# Patient Record
Sex: MALE | Race: WHITE | URBAN - METROPOLITAN AREA
[De-identification: names, ages, dates, MRNs, and addresses within clinical notes are randomized per-mention and may not be internally consistent; named-entity substitution may affect disease eponyms.]

---

## 2022-08-31 ENCOUNTER — TELEPHONE ENCOUNTER (OUTPATIENT)
Dept: URBAN - METROPOLITAN AREA CLINIC 113 | Facility: CLINIC | Age: 57
End: 2022-08-31

## 2022-08-31 ENCOUNTER — TELEPHONE ENCOUNTER (OUTPATIENT)
Dept: URBAN - METROPOLITAN AREA CLINIC 107 | Facility: CLINIC | Age: 57
End: 2022-08-31

## 2022-09-01 ENCOUNTER — WEB ENCOUNTER (OUTPATIENT)
Dept: URBAN - METROPOLITAN AREA CLINIC 113 | Facility: CLINIC | Age: 57
End: 2022-09-01

## 2022-09-07 ENCOUNTER — OFFICE VISIT (OUTPATIENT)
Dept: URBAN - METROPOLITAN AREA CLINIC 113 | Facility: CLINIC | Age: 57
End: 2022-09-07
Payer: COMMERCIAL

## 2022-09-07 VITALS
HEART RATE: 99 BPM | DIASTOLIC BLOOD PRESSURE: 101 MMHG | TEMPERATURE: 97.5 F | HEIGHT: 71 IN | BODY MASS INDEX: 34.44 KG/M2 | WEIGHT: 246 LBS | SYSTOLIC BLOOD PRESSURE: 153 MMHG | RESPIRATION RATE: 14 BRPM

## 2022-09-07 DIAGNOSIS — K92.0 HEMATEMESIS WITH NAUSEA: ICD-10-CM

## 2022-09-07 DIAGNOSIS — K92.1 HEMATOCHEZIA: ICD-10-CM

## 2022-09-07 DIAGNOSIS — K29.00 ACUTE SUPERFICIAL GASTRITIS WITHOUT HEMORRHAGE: ICD-10-CM

## 2022-09-07 DIAGNOSIS — R74.8 ELEVATED LIVER ENZYMES: ICD-10-CM

## 2022-09-07 DIAGNOSIS — F10.10 ALCOHOL ABUSE: ICD-10-CM

## 2022-09-07 PROCEDURE — 99244 OFF/OP CNSLTJ NEW/EST MOD 40: CPT | Performed by: INTERNAL MEDICINE

## 2022-09-07 PROCEDURE — 99204 OFFICE O/P NEW MOD 45 MIN: CPT | Performed by: INTERNAL MEDICINE

## 2022-09-07 RX ORDER — AMLODIPINE BESYLATE 5 MG/1
1 TABLET TABLET ORAL ONCE A DAY
Status: ACTIVE | COMMUNITY

## 2022-09-07 RX ORDER — LORAZEPAM 1 MG/1
1 TABLET AT BEDTIME AS NEEDED TABLET ORAL TWICE A DAY
Status: ACTIVE | COMMUNITY

## 2022-09-07 RX ORDER — PAROXETINE HYDROCHLORIDE HEMIHYDRATE 10 MG/1
1 TABLET IN THE MORNING TABLET, FILM COATED ORAL ONCE A DAY
Status: ACTIVE | COMMUNITY

## 2022-09-07 RX ORDER — PANTOPRAZOLE SODIUM 20 MG/1
1 TABLET TABLET, DELAYED RELEASE ORAL ONCE A DAY
Status: ACTIVE | COMMUNITY

## 2022-09-07 NOTE — HPI-TODAY'S VISIT:
57-year-old male presenting For an initial evaluation.  She was referred by Dr. Timmy Marie for gastritis.  She was seen in the emergency room on August 23, 2022 with heme-positive stool, nausea, and weight loss.  She did have a CT scan performed which demonstrated diffuse hepatic steatosis.  The remainder of her exam was unremarkable.  While in the emergency room he did have labs performed.  He is found have an elevated bilirubin of 1.6, AST 3:15, , and alkaline phosphatase 117.  His magnesium was 1.5. His platelet count was 197.Hepatitis studies were normal.  He is currently on a PPI for 2 weeks and feels alot better. He has had a CSC 8 yrs ago that was normal. He has lost about 30lbs from drinking ETOH (quit 2 weeks). He is back to eating food and gaining weight.

## 2022-10-06 ENCOUNTER — CLAIMS CREATED FROM THE CLAIM WINDOW (OUTPATIENT)
Dept: URBAN - METROPOLITAN AREA CLINIC 4 | Facility: CLINIC | Age: 57
End: 2022-10-06
Payer: COMMERCIAL

## 2022-10-06 ENCOUNTER — OFFICE VISIT (OUTPATIENT)
Dept: URBAN - METROPOLITAN AREA SURGERY CENTER 25 | Facility: SURGERY CENTER | Age: 57
End: 2022-10-06
Payer: COMMERCIAL

## 2022-10-06 DIAGNOSIS — K21.00 GASTROESOPHAGEAL REFLUX DISEASE WITH ESOPHAGITIS: ICD-10-CM

## 2022-10-06 DIAGNOSIS — K29.70 GASTRITIS, UNSPECIFIED, WITHOUT BLEEDING: ICD-10-CM

## 2022-10-06 DIAGNOSIS — K31.89 REACTIVE GASTROPATHY: ICD-10-CM

## 2022-10-06 PROCEDURE — 43239 EGD BIOPSY SINGLE/MULTIPLE: CPT | Performed by: INTERNAL MEDICINE

## 2022-10-06 PROCEDURE — 88305 TISSUE EXAM BY PATHOLOGIST: CPT | Performed by: PATHOLOGY

## 2022-10-06 PROCEDURE — G8907 PT DOC NO EVENTS ON DISCHARG: HCPCS | Performed by: INTERNAL MEDICINE

## 2022-10-06 PROCEDURE — 88312 SPECIAL STAINS GROUP 1: CPT | Performed by: PATHOLOGY

## 2022-10-06 RX ORDER — AMLODIPINE BESYLATE 5 MG/1
1 TABLET TABLET ORAL ONCE A DAY
Status: ACTIVE | COMMUNITY

## 2022-10-06 RX ORDER — PANTOPRAZOLE SODIUM 20 MG/1
1 TABLET TABLET, DELAYED RELEASE ORAL ONCE A DAY
Status: ACTIVE | COMMUNITY

## 2022-10-06 RX ORDER — LORAZEPAM 1 MG/1
1 TABLET AT BEDTIME AS NEEDED TABLET ORAL TWICE A DAY
Status: ACTIVE | COMMUNITY

## 2022-10-06 RX ORDER — PAROXETINE HYDROCHLORIDE HEMIHYDRATE 10 MG/1
1 TABLET IN THE MORNING TABLET, FILM COATED ORAL ONCE A DAY
Status: ACTIVE | COMMUNITY

## 2022-10-20 ENCOUNTER — TELEPHONE ENCOUNTER (OUTPATIENT)
Dept: URBAN - METROPOLITAN AREA CLINIC 113 | Facility: CLINIC | Age: 57
End: 2022-10-20

## 2022-10-26 ENCOUNTER — OFFICE VISIT (OUTPATIENT)
Dept: URBAN - METROPOLITAN AREA CLINIC 113 | Facility: CLINIC | Age: 57
End: 2022-10-26

## 2022-10-26 ENCOUNTER — TELEPHONE ENCOUNTER (OUTPATIENT)
Dept: URBAN - METROPOLITAN AREA CLINIC 113 | Facility: CLINIC | Age: 57
End: 2022-10-26

## 2022-10-27 PROBLEM — 266433003 GASTROESOPHAGEAL REFLUX DISEASE WITH ESOPHAGITIS: Status: ACTIVE | Noted: 2022-10-27

## 2022-11-09 ENCOUNTER — DASHBOARD ENCOUNTERS (OUTPATIENT)
Age: 57
End: 2022-11-09

## 2022-11-09 ENCOUNTER — OFFICE VISIT (OUTPATIENT)
Dept: URBAN - METROPOLITAN AREA CLINIC 113 | Facility: CLINIC | Age: 57
End: 2022-11-09
Payer: COMMERCIAL

## 2022-11-09 VITALS
SYSTOLIC BLOOD PRESSURE: 133 MMHG | DIASTOLIC BLOOD PRESSURE: 91 MMHG | WEIGHT: 250 LBS | RESPIRATION RATE: 20 BRPM | HEIGHT: 71 IN | BODY MASS INDEX: 35 KG/M2 | TEMPERATURE: 97.5 F | HEART RATE: 93 BPM

## 2022-11-09 DIAGNOSIS — K29.00 ACUTE SUPERFICIAL GASTRITIS WITHOUT HEMORRHAGE: ICD-10-CM

## 2022-11-09 DIAGNOSIS — K21.00 GASTROESOPHAGEAL REFLUX DISEASE WITH ESOPHAGITIS WITHOUT HEMORRHAGE: ICD-10-CM

## 2022-11-09 DIAGNOSIS — K92.0 HEMATEMESIS WITH NAUSEA: ICD-10-CM

## 2022-11-09 DIAGNOSIS — F10.10 ALCOHOL ABUSE: ICD-10-CM

## 2022-11-09 DIAGNOSIS — K92.1 HEMATOCHEZIA: ICD-10-CM

## 2022-11-09 DIAGNOSIS — R74.8 ELEVATED LIVER ENZYMES: ICD-10-CM

## 2022-11-09 PROBLEM — 15167005: Status: ACTIVE | Noted: 2022-09-07

## 2022-11-09 PROBLEM — 266433003: Status: ACTIVE | Noted: 2022-11-09

## 2022-11-09 PROCEDURE — 99214 OFFICE O/P EST MOD 30 MIN: CPT

## 2022-11-09 RX ORDER — PANTOPRAZOLE SODIUM 20 MG/1
1 TABLET TABLET, DELAYED RELEASE ORAL ONCE A DAY
Status: ACTIVE | COMMUNITY

## 2022-11-09 RX ORDER — LORAZEPAM 1 MG/1
1 TABLET AT BEDTIME AS NEEDED TABLET ORAL TWICE A DAY
Status: ACTIVE | COMMUNITY

## 2022-11-09 RX ORDER — AMLODIPINE BESYLATE 10 MG/1
1 TABLET TABLET ORAL ONCE A DAY
Status: ACTIVE | COMMUNITY

## 2022-11-09 RX ORDER — PAROXETINE HYDROCHLORIDE HEMIHYDRATE 10 MG/1
1 TABLET IN THE MORNING TABLET, FILM COATED ORAL ONCE A DAY
Status: ACTIVE | COMMUNITY

## 2022-11-09 RX ORDER — PANTOPRAZOLE SODIUM 40 MG/1
1 TABLET TABLET, DELAYED RELEASE ORAL ONCE A DAY
Qty: 90 TABLET | Refills: 2 | OUTPATIENT
Start: 2022-11-09

## 2022-11-09 NOTE — HPI-TODAY'S VISIT:
57-year-old male presents for follow-up after EGD.  He was last seen on 9/7/2022.  He did report hematemesis with nausea possibly exacerbated by drinking alcohol.  He was recommended complete alcohol cessation and an EGD.  An EGD was scheduled and we will plan to repeat LFTs at follow-up.  We would consider colonoscopy pending results.  EGD 10/6/2022:Performed without difficulty.  LA grade a reflux esophagitis without bleeding in the lower third of the esophagus status post biopsy.  Pathology revealed squamous mucosa without abnormality, no evidence for infection, eosinophilic esophagitis, dysplasia or malignancy.  Diffuse inflammation noted throughout the gastric body and antrum status post biopsies.  Pathology revealed chemical/reactive gastropathy without evidence of H. pylori or intestinal metaplasia.  Duodenum was normal.  He was recommended to continue reflux medication and follow-up as planned.   Patient admits to improvement in symptoms. He does still have GERD with consumption of spicy foods. He does admit to mild nausea. He denies abdominal pain. Bowel movements are regular and without blood per rectum or melena. He has not had ETOH in at least two months. He denies hematemesis. He had blood work in October which revealed improving LFTs. His father had Conn's syndrome.  Labs 10/14/22: AST 85, , , normal TB.   9/7/22: 57-year-old male presenting For an initial evaluation.  She was referred by Dr. Timmy Marie for gastritis.  She was seen in the emergency room on August 23, 2022 with heme-positive stool, nausea, and weight loss.  She did have a CT scan performed which demonstrated diffuse hepatic steatosis.  The remainder of her exam was unremarkable.  While in the emergency room he did have labs performed.  He is found have an elevated bilirubin of 1.6, AST 3:15, , and alkaline phosphatase 117.  His magnesium was 1.5. His platelet count was 197.Hepatitis studies were normal.  He is currently on a PPI for 2 weeks and feels alot better. He has had a CSC 8 yrs ago that was normal. He has lost about 30lbs from drinking ETOH (quit 2 weeks). He is back to eating food and gaining weight.

## 2022-11-10 LAB
ALBUMIN/GLOBULIN RATIO: 2
ALBUMIN: 5
ALKALINE PHOSPHATASE: 87
ALT (SGPT): 34
AST (SGOT): 22
BILIRUBIN, DIRECT: 0.1
BILIRUBIN, INDIRECT: 0.6
BILIRUBIN, TOTAL: 0.7
GLOBULIN: 2.5
PROTEIN, TOTAL: 7.5

## 2023-02-09 ENCOUNTER — WEB ENCOUNTER (OUTPATIENT)
Dept: URBAN - METROPOLITAN AREA CLINIC 113 | Facility: CLINIC | Age: 58
End: 2023-02-09

## 2023-02-14 ENCOUNTER — OFFICE VISIT (OUTPATIENT)
Dept: URBAN - METROPOLITAN AREA CLINIC 113 | Facility: CLINIC | Age: 58
End: 2023-02-14

## 2023-02-14 NOTE — HPI-TODAY'S VISIT:
49 male presents for follow-up he was last seen on 11/9/2022.  He was recommended to increase pantoprazole to 40 mg daily due to evidence of reflux esophagitis.  His LFTs were improving with alcohol cessation.  He is planned for repeat labs with consideration of additional labs if LFTs trended up.  He was to avoid alcohol.  Hepatic function panel on 11/9/2022 was normal..  11/9/22 57-year-old male presents for follow-up after EGD.  He was last seen on 9/7/2022.  He did report hematemesis with nausea possibly exacerbated by drinking alcohol.  He was recommended complete alcohol cessation and an EGD.  An EGD was scheduled and we will plan to repeat LFTs at follow-up.  We would consider colonoscopy pending results.  EGD 10/6/2022:Performed without difficulty.  LA grade a reflux esophagitis without bleeding in the lower third of the esophagus status post biopsy.  Pathology revealed squamous mucosa without abnormality, no evidence for infection, eosinophilic esophagitis, dysplasia or malignancy.  Diffuse inflammation noted throughout the gastric body and antrum status post biopsies.  Pathology revealed chemical/reactive gastropathy without evidence of H. pylori or intestinal metaplasia.  Duodenum was normal.  He was recommended to continue reflux medication and follow-up as planned.   Patient admits to improvement in symptoms. He does still have GERD with consumption of spicy foods. He does admit to mild nausea. He denies abdominal pain. Bowel movements are regular and without blood per rectum or melena. He has not had ETOH in at least two months. He denies hematemesis. He had blood work in October which revealed improving LFTs. His father had Conn's syndrome.  Labs 10/14/22: AST 85, , , normal TB.   9/7/22: 57-year-old male presenting For an initial evaluation.  She was referred by Dr. Timmy Marie for gastritis.  She was seen in the emergency room on August 23, 2022 with heme-positive stool, nausea, and weight loss.  She did have a CT scan performed which demonstrated diffuse hepatic steatosis.  The remainder of her exam was unremarkable.  While in the emergency room he did have labs performed.  He is found have an elevated bilirubin of 1.6, AST 3:15, , and alkaline phosphatase 117.  His magnesium was 1.5. His platelet count was 197.Hepatitis studies were normal.  He is currently on a PPI for 2 weeks and feels alot better. He has had a CSC 8 yrs ago that was normal. He has lost about 30lbs from drinking ETOH (quit 2 weeks). He is back to eating food and gaining weight.